# Patient Record
Sex: MALE | URBAN - METROPOLITAN AREA
[De-identification: names, ages, dates, MRNs, and addresses within clinical notes are randomized per-mention and may not be internally consistent; named-entity substitution may affect disease eponyms.]

---

## 2024-08-05 ENCOUNTER — APPOINTMENT (RX ONLY)
Age: 74
Setting detail: DERMATOLOGY
End: 2024-08-05

## 2024-08-05 DIAGNOSIS — D69.2 OTHER NONTHROMBOCYTOPENIC PURPURA: ICD-10-CM | Status: RESOLVING

## 2024-08-05 PROCEDURE — ? TREATMENT REGIMEN

## 2024-08-05 PROCEDURE — 99202 OFFICE O/P NEW SF 15 MIN: CPT

## 2024-08-05 PROCEDURE — ? COUNSELING

## 2024-08-05 ASSESSMENT — LOCATION DETAILED DESCRIPTION DERM
LOCATION DETAILED: RIGHT RADIAL DORSAL HAND
LOCATION DETAILED: LEFT ULNAR DORSAL HAND

## 2024-08-05 ASSESSMENT — LOCATION SIMPLE DESCRIPTION DERM
LOCATION SIMPLE: LEFT HAND
LOCATION SIMPLE: RIGHT HAND

## 2024-08-05 ASSESSMENT — LOCATION ZONE DERM: LOCATION ZONE: HAND

## 2024-08-05 ASSESSMENT — SEVERITY ASSESSMENT: SEVERITY: MILD TO MODERATE

## 2024-08-05 NOTE — HPI: OTHER
Condition:: pain of the hands x 6 years.
Please Describe Your Condition:: is a new patient who is being seen for a chief complaint of pain of the hands x 6 years. Patient has seen different providers, but is unsure what type of MD and was told it was neuropathy. Patient experiences pain at times. Patient has had several treatments for neuropathy including topical sprays, infusions, and injections. No nerve conduction testing has been done.

## 2024-08-05 NOTE — PROCEDURE: TREATMENT REGIMEN
Otc Regimen: DerMend
Detail Level: Zone
Plan: Discussed neuropathy concerns with patient. Suggested second opinion with board-certified neurologist.\\nSuggested sunscreen use or sun protective clothing/sleeves